# Patient Record
Sex: FEMALE | Race: WHITE | Employment: OTHER | URBAN - METROPOLITAN AREA
[De-identification: names, ages, dates, MRNs, and addresses within clinical notes are randomized per-mention and may not be internally consistent; named-entity substitution may affect disease eponyms.]

---

## 2017-05-03 ENCOUNTER — HOSPITAL ENCOUNTER (OUTPATIENT)
Dept: GENERAL RADIOLOGY | Age: 67
Discharge: HOME | End: 2017-05-03
Attending: OBSTETRICS & GYNECOLOGY | Admitting: OBSTETRICS & GYNECOLOGY

## 2017-05-03 DIAGNOSIS — Z13.820 ENCOUNTER FOR SCREENING FOR OSTEOPOROSIS: ICD-10-CM

## 2017-05-03 DIAGNOSIS — Z13.820 SCREENING FOR OSTEOPOROSIS: ICD-10-CM

## 2018-10-26 DIAGNOSIS — Z12.11 SCREENING FOR COLON CANCER: ICD-10-CM

## 2018-10-26 DIAGNOSIS — Z12.11 SCREENING FOR COLON CANCER: Primary | ICD-10-CM

## 2018-10-26 NOTE — TELEPHONE ENCOUNTER
Colonoscopy scheduled. Golytely and Dulcolax needs ordered. Please review allergy list before you sign.

## 2018-10-29 ENCOUNTER — TELEPHONE (OUTPATIENT)
Dept: GASTROENTEROLOGY | Age: 68
End: 2018-10-29

## 2018-11-20 ENCOUNTER — HOSPITAL ENCOUNTER (OUTPATIENT)
Dept: GENERAL RADIOLOGY | Age: 68
Discharge: HOME | End: 2018-11-20
Payer: MEDICARE

## 2018-11-20 ENCOUNTER — HOSPITAL ENCOUNTER (OUTPATIENT)
Age: 68
Discharge: HOME | End: 2018-11-20
Payer: MEDICARE

## 2018-11-20 DIAGNOSIS — M54.41 ACUTE BILATERAL LOW BACK PAIN WITH RIGHT-SIDED SCIATICA: ICD-10-CM

## 2018-11-20 PROCEDURE — 72100 X-RAY EXAM L-S SPINE 2/3 VWS: CPT

## 2018-12-03 ENCOUNTER — TELEPHONE (OUTPATIENT)
Dept: GASTROENTEROLOGY | Age: 68
End: 2018-12-03

## 2021-01-06 ENCOUNTER — HOSPITAL ENCOUNTER (OUTPATIENT)
Dept: PHYSICAL THERAPY | Age: 71
Setting detail: THERAPIES SERIES
Discharge: HOME | End: 2021-01-06
Payer: MEDICARE

## 2021-01-06 NOTE — FLOWSHEET NOTE
[facility] Outpatient Rehabilitation and Therapy  [address] [place] [address], [address] [facility] [address]  Phone: [phone]   Fax: [phone]      Physical Therapy  Cancellation/No-show Note  Patient Name:  Houston Fernández  :  1950   Date:  2021  Cancels to date: 1  No-shows to date: 0    For today's appointment patient:  [x] Cancelled  [] Rescheduled appointment  [] No-show     Reason given by patient:  [] Patient ill  [] Conflicting appointment  [] No transportation    [] Conflict with work  [] No reason given  [x] Other:     Comments:  \"hurt back\"    Electronically signed by:  Luis Armando Calvert, PT, DPT

## 2021-01-06 NOTE — PROGRESS NOTES
Physical Therapy  Patient called and left a voicemail stating that she hurt her back and could not  Keep todays appointment

## 2021-08-09 ENCOUNTER — HOSPITAL ENCOUNTER (OUTPATIENT)
Dept: PHYSICAL THERAPY | Age: 71
Setting detail: THERAPIES SERIES
Discharge: HOME | End: 2021-08-09
Payer: MEDICARE

## 2021-08-09 PROCEDURE — 97140 MANUAL THERAPY 1/> REGIONS: CPT

## 2021-08-09 PROCEDURE — 97530 THERAPEUTIC ACTIVITIES: CPT

## 2021-08-09 PROCEDURE — 97161 PT EVAL LOW COMPLEX 20 MIN: CPT
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
## 2021-08-09 NOTE — FLOWSHEET NOTE
[facility] Outpatient Rehabilitation and Therapy  [address] [place] [address], [address] [place] [address] [address]  Phone: [phone]   Fax: [phone]        Physical Therapy Daily Treatment Note    Date:  2021    Patient Name:  Cristy Dolan    :  1950  MRN: [id number]  Restrictions/Precautions: universal   Medical/Treatment Diagnosis Information:  · Diagnosis: R32 Unspecified Urinary Incontinence  Insurance/Certification information:  PT Insurance Information: A[employer]na Medicare  Physician Information:  Referring Practitioner: Dr. Gonzalez Freedman DO  Plan of care signed (Y/N): N, faxed  Visit# / total visits:       Functional Outcome (if applicable):          PFDI: 113/300    Time in:   2:00pm      Timed Treatment: 70min. Total Treatment Time: 85min.  ________________________________________________________________________________________    Pain Level:    /10  SUBJECTIVE:  see eval    OBJECTIVE:     Exercise (TE) Resistance/Repetitions Other comments            PF strengthening                                  PF relaxation                                   Other Treatment:   TA:  Bladder re-training/education:     Bladder Diary: patient educated on importance of filling out bladder diary at home, complete with fluid intake, voids, and leakage when applicable. Voiding: patient educated on normal voiding and urinary cycle and the physiology of bladder control muscles and pelvic floor. The patient was educated on bladder dysfunction as well as YAS/UUI with urethral involvement. The patient was also educated on prolapse and it's affect on urination. Dietary: patient educated on the \"4Cs\" to reduce bladder irritation and leakage.     Other: general pelvic floor education    Manual Treatments:  PERFECT, myofacial release       Test/Measurements:  See eval           ASSESSMENT:    See eval     Treatment/Activity Tolerance:   [x] Patient tolerated treatment well [] Patient limited by radhames  [] Patient limited by pain [] Patient limited by other medical complications  [] Other:     Goals:       Patient stated goal: \"hold my urine\"  []? Progressing: []? Met: []? Not Met: []? Adjusted        Therapist goals for Patient:   Short Term Goals: To be achieved in: 2 weeks  1. Independent in HEP and progression per patient tolerance, in order to prevent future occurrence of presenting issue. []? Progressing: []? Met: []? Not Met: []? Adjusted  2. Patient will have a decrease in urinary incontinence to indicate improvement in pelvic floor strength and relaxation, muscle coordination, and/or bladder retraining.  []? Progressing: []? Met: []? Not Met: []? Adjusted     Long Term Goals: To be achieved in: 8 weeks  1. Disability index score of 50 or less for the PFDI-20 to assist with reaching prior level of function. []? Progressing: []? Met: []? Not Met: []? Adjusted  2. Patient will increase PERF score to 4/5 and endurance to 10sec. to demonstrate increased strength and control over pelvic floor musculature. []? Progressing: []? Met: []? Not Met: []? Adjusted  3. Patient will report 1 day or greater without need for urinary pad to progress towards completing ADLs and recreational activities without leakage. []? Progressing: []? Met: []? Not Met: []? Adjusted   4. Patient will return to functional activities including walking, coughing, lifting without increased symptoms or restriction. []? Progressing: []? Met: []? Not Met: []?  Adjusted          Plan: [x] Continue per plan of care [] Alter current plan (see comments)   [] Plan of care initiated [] Hold pending MD visit [] Discharge      Plan for Next Session: review diary, begin TE    Re-Certification Due Date: 9/9/2021        Signature:  Kalina Ramirez, PT, DPT

## 2021-08-16 ENCOUNTER — HOSPITAL ENCOUNTER (OUTPATIENT)
Dept: PHYSICAL THERAPY | Age: 71
Setting detail: THERAPIES SERIES
Discharge: HOME | End: 2021-08-16
Payer: MEDICARE

## 2021-08-16 PROCEDURE — 97110 THERAPEUTIC EXERCISES: CPT

## 2021-08-16 PROCEDURE — 97530 THERAPEUTIC ACTIVITIES: CPT

## 2021-08-16 NOTE — FLOWSHEET NOTE
[facility] Outpatient Rehabilitation and Therapy  [address] [place] [address], [address] [facility] [address]  Phone: [phone]   Fax: [phone]        Physical Therapy Daily Treatment Note    Date:  2021    Patient Name:  Hyun Calvillo    :  1950  MRN: [id number]  Restrictions/Precautions: universal   Medical/Treatment Diagnosis Information:  · Diagnosis: R32 Unspecified Urinary Incontinence  Insurance/Certification information:  PT Insurance Information: A[employer]na Medicare  Physician Information:  Referring Practitioner: Dr. Alexi Garrett. Brandyn Rowe DO  Plan of care signed (Y/N): N, faxed  Visit# / total visits:       Functional Outcome (if applicable):          PFDI: 113/300    Time in:   3:30pm      Timed Treatment: 45min. Total Treatment Time: 45min.  ________________________________________________________________________________________    Pain Level:    0/10  SUBJECTIVE:  Patient reports \"I am doing good\". No new complaints. OBJECTIVE:     Exercise (TE) Resistance/Repetitions Other comments            PF strengthening        Short hold: (1sec) 10 times       Long hold: (5sec) 10 times                     PF relaxation                                   HEP:  Access Code: [email or web address]  URL: [email or web address]  Date: 2021  Prepared by: Yuni Cole    Exercises  Seated Pelvic Floor Contraction - 1 x daily - 7 x weekly - 1 sets - 10 reps - 1sec. hold  Seated Pelvic Floor Contraction - 1 x daily - 7 x weekly - 1 sets - 10 reps - 5sec. hold      Other Treatment:   TA:  Bladder re-training/education:     Bladder Diary: 7-10day voids, 6-8 leaks/day, 5 BMs/day \"knots\",     Voiding: voiding every 2-3 hours    Dietary: patient educated on the \"4Cs\" to reduce bladder irritation and leakage, fiber education.     Other: water goal: 60oz/day, fiber goal: 20g/day    Manual Treatments:       Test/Measurements:  See eval           ASSESSMENT:   The patient is reporting frequent urination, leaks and hard BMs. The patient is consuming some bladder irritants and limited fiber. The patient is also on a lot of medication that could be contributing to constipation as well. Patient responded well to education provided and should improve with compliance. Introduced TE to patient this date, patient responded well. Will follow up in 1 week. Treatment/Activity Tolerance:   [x] Patient tolerated treatment well [] Patient limited by fatique  [] Patient limited by pain [] Patient limited by other medical complications  [] Other:     Goals:       Patient stated goal: \"hold my urine\"  []? Progressing: []? Met: []? Not Met: []? Adjusted        Therapist goals for Patient:   Short Term Goals: To be achieved in: 2 weeks  1. Independent in HEP and progression per patient tolerance, in order to prevent future occurrence of presenting issue. []? Progressing: []? Met: []? Not Met: []? Adjusted  2. Patient will have a decrease in urinary incontinence to indicate improvement in pelvic floor strength and relaxation, muscle coordination, and/or bladder retraining.  []? Progressing: []? Met: []? Not Met: []? Adjusted     Long Term Goals: To be achieved in: 8 weeks  1. Disability index score of 50 or less for the PFDI-20 to assist with reaching prior level of function. []? Progressing: []? Met: []? Not Met: []? Adjusted  2. Patient will increase PERF score to 4/5 and endurance to 10sec. to demonstrate increased strength and control over pelvic floor musculature. []? Progressing: []? Met: []? Not Met: []? Adjusted  3. Patient will report 1 day or greater without need for urinary pad to progress towards completing ADLs and recreational activities without leakage. []? Progressing: []? Met: []? Not Met: []? Adjusted   4. Patient will return to functional activities including walking, coughing, lifting without increased symptoms or restriction. []? Progressing: []? Met: []? Not Met: []?  Adjusted          Plan: [x] Continue per plan of care [] Alter current plan (see comments)   [] Plan of care initiated [] Hold pending MD visit [] Discharge      Plan for Next Session: cont.  TE    Re-Certification Due Date: 9/9/2021        Signature:  Karen Durant, PT, DPT

## 2021-08-23 ENCOUNTER — HOSPITAL ENCOUNTER (OUTPATIENT)
Dept: PHYSICAL THERAPY | Age: 71
Setting detail: THERAPIES SERIES
Discharge: HOME | End: 2021-08-23
Payer: MEDICARE

## 2021-08-23 PROCEDURE — 97110 THERAPEUTIC EXERCISES: CPT

## 2021-08-23 NOTE — FLOWSHEET NOTE
[facility] Outpatient Rehabilitation and Therapy  [address] [place] [address] 88, [address] [facility] [address]  Phone: [phone]   Fax: [phone]        Physical Therapy Daily Treatment Note    Date:  2021    Patient Name:  Lorenzo Bonner    :  1950  MRN: [id number]  Restrictions/Precautions: universal   Medical/Treatment Diagnosis Information:  · Diagnosis: R32 Unspecified Urinary Incontinence  Insurance/Certification information:  PT Insurance Information: Aetna Medicare  Physician Information:  Referring Practitioner: Dr. Krzysztof Kumari. Khushi Dona DO  Plan of care signed (Y/N): N, faxed  Visit# / total visits:  3/8     Functional Outcome (if applicable):          PFDI: 113/300    Time in:   2:00pm      Timed Treatment: 45min. Total Treatment Time: 45min.  ________________________________________________________________________________________    Pain Level:    0/10  SUBJECTIVE:  Patient reports \"I am doing okay, so many things I am allergic to have fiber in them\". Reports \"nuts help more than anything, so I have been eating those\". Reports \"overall it seems to have been better with leaking\". No new complaints. OBJECTIVE:     Exercise (TE) Resistance/Repetitions Other comments            PF strengthening        Short hold: (1sec) 10 times       Long hold: (5sec) 10 times     PF + Hip ABD x10 W/ blue tband   PF + Hip ADD x10 W/ ball squeeze         Hook-lying TA  Until achieved with BP cuff  TA only [id number]. TA + PF [id number]. PF relaxation                                   HEP:  Access Code: [email or web address]  URL: [email or web address]  Date: 2021  Prepared by: Laly Sandhu    Exercises  Seated Pelvic Floor Contraction - 1 x daily - 7 x weekly - 1 sets - 10 reps - 1sec. hold  Seated Pelvic Floor Contraction - 1 x daily - 7 x weekly - 1 sets - 10 reps - 5sec. hold  Seated Pelvic Floor Contraction with Hip Abduction and Resistance Loop - 1 x daily - 7 x weekly - 1 sets - 10 reps - 2-3sec. hold  Seated Pelvic Floor Contraction with Isometric Hip Adduction - 1 x daily - 7 x weekly - 1 sets - 10 reps - 2-3sec. hold  Supine Transversus Abdominis Bracing with Pelvic Floor Contraction - 1 x daily - 7 x weekly - 1 sets - 10 reps - 10sec. hold  Seated Transversus Abdominis Bracing - 1 x daily - 7 x weekly - 1 sets - 10 reps        Other Treatment:       Manual Treatments:       Test/Measurements:  See eval           ASSESSMENT: The patient is progressing nicely overall, with reduced complaints of leakage. The patient adapted nicely to added TE this date. The patient will continue to progress and follow up next week for progression. Treatment/Activity Tolerance:   [x] Patient tolerated treatment well [] Patient limited by fatique  [] Patient limited by pain [] Patient limited by other medical complications  [] Other:     Goals:       Patient stated goal: \"hold my urine\"  []? Progressing: []? Met: []? Not Met: []? Adjusted        Therapist goals for Patient:   Short Term Goals: To be achieved in: 2 weeks  1. Independent in HEP and progression per patient tolerance, in order to prevent future occurrence of presenting issue. []? Progressing: []? Met: []? Not Met: []? Adjusted  2. Patient will have a decrease in urinary incontinence to indicate improvement in pelvic floor strength and relaxation, muscle coordination, and/or bladder retraining.  []? Progressing: []? Met: []? Not Met: []? Adjusted     Long Term Goals: To be achieved in: 8 weeks  1. Disability index score of 50 or less for the PFDI-20 to assist with reaching prior level of function. []? Progressing: []? Met: []? Not Met: []? Adjusted  2. Patient will increase PERF score to 4/5 and endurance to 10sec. to demonstrate increased strength and control over pelvic floor musculature. []? Progressing: []? Met: []? Not Met: []? Adjusted  3.   Patient will report 1 day or greater without need for urinary pad to progress towards completing ADLs and recreational activities without leakage. []? Progressing: []? Met: []? Not Met: []? Adjusted   4. Patient will return to functional activities including walking, coughing, lifting without increased symptoms or restriction. []? Progressing: []? Met: []? Not Met: []? Adjusted          Plan: [x] Continue per plan of care [] Alter current plan (see comments)   [] Plan of care initiated [] Hold pending MD visit [] Discharge      Plan for Next Session: cont.  TE    Re-Certification Due Date: 9/9/2021        Signature:  Deric Galvan, PT, DPT

## 2021-08-30 ENCOUNTER — HOSPITAL ENCOUNTER (OUTPATIENT)
Dept: PHYSICAL THERAPY | Age: 71
Setting detail: THERAPIES SERIES
Discharge: HOME | End: 2021-08-30
Payer: MEDICARE

## 2021-08-30 NOTE — PROGRESS NOTES
Physical Therapy  Patient left voicemail stating that she needed to cancel todays appointment 8/31/21 no reason given, confirmed next appointment on 9/6/21

## 2021-08-30 NOTE — FLOWSHEET NOTE
[facility] Outpatient Rehabilitation and Therapy  [address] [place] [address] 30, [address] [facility] [address]  Phone: [phone]   Fax: [phone]      Physical Therapy  Cancellation/No-show Note  Patient Name:  Jennifer Mabry  :  1950   Date:  2021  Cancels to date: 1  No-shows to date: 0    For today's appointment patient:  [x] Cancelled  [] Rescheduled appointment  [] No-show     Reason given by patient:  [] Patient ill  [] Conflicting appointment  [] No transportation    [] Conflict with work  [x] No reason given  [] Other:     Comments:      Electronically signed by:  Randa Hodgkins, PT

## 2021-09-06 ENCOUNTER — APPOINTMENT (OUTPATIENT)
Dept: PHYSICAL THERAPY | Age: 71
End: 2021-09-06
Payer: MEDICARE

## 2021-09-13 ENCOUNTER — HOSPITAL ENCOUNTER (OUTPATIENT)
Dept: PHYSICAL THERAPY | Age: 71
Setting detail: THERAPIES SERIES
Discharge: HOME | End: 2021-09-13
Payer: MEDICARE

## 2021-09-13 PROCEDURE — 97110 THERAPEUTIC EXERCISES: CPT

## 2021-09-13 PROCEDURE — 97530 THERAPEUTIC ACTIVITIES: CPT

## 2021-09-13 NOTE — FLOWSHEET NOTE
[facility] Outpatient Rehabilitation and Therapy  [address] [place] [address] 79, [address] [facility] [address]  Phone: [phone]   Fax: [phone]        Physical Therapy Daily Treatment Note    Date:  2021    Patient Name:  Belen Day    :  1950  MRN: [id number]  Restrictions/Precautions: universal   Medical/Treatment Diagnosis Information:  · Diagnosis: R32 Unspecified Urinary Incontinence  Insurance/Certification information:  PT Insurance Information: Aetna Medicare  Physician Information:  Referring Practitioner: Dr. Rochelle Palmer. Andrzej Bowie DO  Plan of care signed (Y/N): N, faxed  Visit# / total visits:       Functional Outcome (if applicable):          PFDI: 113/300    Time in:   1:20pm      Timed Treatment: 45min. Total Treatment Time: 45min.  ________________________________________________________________________________________    Pain Level:    0/10  SUBJECTIVE:  Patient reports her incontinence is worse since she strained her back last week. Reports before she strained her back her incontinence was getting better, except \"I think dark cherries aren't good for my bladder\". No new complaints. OBJECTIVE:     Exercise (TE) Resistance/Repetitions Other comments          PF strengthening        Short hold: (1sec) 10 times       Long hold: (5sec) 10 times     PF + Hip ABD x10 W/ blue tband   PF + Hip ADD x10 W/ ball squeeze         Hook-lying TA  Until achieved with BP cuff  TA only [id number]. TA + PF [id number]. PF relaxation                                   HEP:  Access Code: [email or web address]  URL: [email or web address]  Date: 2021  Prepared by: Mario Stuart    Exercises  Seated Pelvic Floor Contraction - 1 x daily - 7 x weekly - 1 sets - 10 reps - 1sec. hold  Seated Pelvic Floor Contraction - 1 x daily - 7 x weekly - 1 sets - 10 reps - 5sec. hold  Seated Pelvic Floor Contraction with Hip Abduction and Resistance Loop - 1 x daily - 7 x weekly - 1 sets - 10 reps - 2-3sec. hold  Seated Pelvic Floor Contraction with Isometric Hip Adduction - 1 x daily - 7 x weekly - 1 sets - 10 reps - 2-3sec. hold  Supine Transversus Abdominis Bracing with Pelvic Floor Contraction - 1 x daily - 7 x weekly - 1 sets - 10 reps - 10sec. hold  Seated Transversus Abdominis Bracing - 1 x daily - 7 x weekly - 1 sets - 10 reps        Other Treatment:       Manual Treatments:       Test/Measurements:  See eval           ASSESSMENT: The patient is progressing nicely overall, with reduced complaints of leakage. The patient adapted nicely to added TE this date. The patient will continue to progress and follow up in 2 weeks for progression. Treatment/Activity Tolerance:   [x] Patient tolerated treatment well [] Patient limited by fatique  [] Patient limited by pain [] Patient limited by other medical complications  [] Other:     Goals:       Patient stated goal: \"hold my urine\"  []? Progressing: []? Met: []? Not Met: []? Adjusted        Therapist goals for Patient:   Short Term Goals: To be achieved in: 2 weeks  1. Independent in HEP and progression per patient tolerance, in order to prevent future occurrence of presenting issue. []? Progressing: []? Met: []? Not Met: []? Adjusted  2. Patient will have a decrease in urinary incontinence to indicate improvement in pelvic floor strength and relaxation, muscle coordination, and/or bladder retraining.  []? Progressing: []? Met: []? Not Met: []? Adjusted     Long Term Goals: To be achieved in: 8 weeks  1. Disability index score of 50 or less for the PFDI-20 to assist with reaching prior level of function. []? Progressing: []? Met: []? Not Met: []? Adjusted  2. Patient will increase PERF score to 4/5 and endurance to 10sec. to demonstrate increased strength and control over pelvic floor musculature. []? Progressing: []? Met: []? Not Met: []? Adjusted  3.   Patient will report 1 day or greater without need for urinary pad to progress towards completing ADLs and recreational activities without leakage. []? Progressing: []? Met: []? Not Met: []? Adjusted   4. Patient will return to functional activities including walking, coughing, lifting without increased symptoms or restriction. []? Progressing: []? Met: []? Not Met: []? Adjusted          Plan: [x] Continue per plan of care [] Alter current plan (see comments)   [] Plan of care initiated [] Hold pending MD visit [] Discharge      Plan for Next Session: cont.  TE    Re-Certification Due Date: 9/9/2021        Signature:  Shara Schaumann, PT, DPT

## 2021-09-20 ENCOUNTER — APPOINTMENT (OUTPATIENT)
Dept: PHYSICAL THERAPY | Age: 71
End: 2021-09-20
Payer: MEDICARE

## 2021-09-27 ENCOUNTER — HOSPITAL ENCOUNTER (OUTPATIENT)
Dept: PHYSICAL THERAPY | Age: 71
Setting detail: THERAPIES SERIES
Discharge: HOME | End: 2021-09-27
Payer: MEDICARE

## 2021-09-27 PROCEDURE — 97140 MANUAL THERAPY 1/> REGIONS: CPT

## 2021-09-27 PROCEDURE — 97110 THERAPEUTIC EXERCISES: CPT

## 2021-09-27 PROCEDURE — 97530 THERAPEUTIC ACTIVITIES: CPT

## 2021-09-27 NOTE — FLOWSHEET NOTE
[facility] Outpatient Rehabilitation and Therapy  [address] [place] [address] 14, [address] [facility] [address]  Phone: [phone]   Fax: [phone]        Physical Therapy Daily Treatment Note and Discharge Summary     Date:  2021    Patient Name:  Tomasa Woo    :  1950  MRN: [id number]  Restrictions/Precautions: universal   Medical/Treatment Diagnosis Information:  · Diagnosis: R32 Unspecified Urinary Incontinence  Insurance/Certification information:  PT Insurance Information: Aetna Medicare  Physician Information:  Referring Practitioner: Dr. Sharla Anthony. Rita Hernandez DO  Plan of care signed (Y/N): N, faxed  Visit# / total visits:  5 (2021 to 2021)     Functional Outcome (if applicable):          PFDI: 128/300 (was 113/300)    Time in:   4:10pm      Timed Treatment: 45min. Total Treatment Time: 45min.  ________________________________________________________________________________________    Pain Level:    0/10  SUBJECTIVE:  Patient reports \"my back is better since I have moving around and doing some stretching\". \"I think the exercises are making the pain worse, so I am going to stop therapy for my pelvic floor until I get my back figured out\". Patient reports her incontinence is not better. No new complaints. OBJECTIVE:     Exercise (TE) Resistance/Repetitions Other comments          PF strengthening        Short hold: (1sec) 10 times       Long hold: (5sec) 10 times     PF + Hip ABD x10 W/ blue tband   PF + Hip ADD x10 W/ ball squeeze         Hook-lying TA  Until achieved with BP cuff  TA only [id number]. TA + PF [id number]. HEP:  Access Code: [email or web address]  URL: [email or web address]  Date: 2021  Prepared by: Lorna Callaway    Exercises  Seated Pelvic Floor Contraction - 1 x daily - 7 x weekly - 1 sets - 10 reps - 1sec. hold  Seated Pelvic Floor Contraction - 1 x daily - 7 x weekly - 1 sets - 10 reps - 5sec. hold  Seated Pelvic Floor Contraction with Hip Abduction and Resistance Loop - 1 x daily - 7 x weekly - 1 sets - 10 reps - 2-3sec. hold  Seated Pelvic Floor Contraction with Isometric Hip Adduction - 1 x daily - 7 x weekly - 1 sets - 10 reps - 2-3sec. hold  Supine Transversus Abdominis Bracing with Pelvic Floor Contraction - 1 x daily - 7 x weekly - 1 sets - 10 reps - 10sec. hold  Seated Transversus Abdominis Bracing - 1 x daily - 7 x weekly - 1 sets - 10 reps        Other Treatment:       Manual Treatments:  PERFECT    Test/Measurements:    External:  Skin Condition: normal  Sensation: intact  Palpation: no point tenderness noted  Tone: normal  Perineal Body Mobility:               Voluntary PFM Contraction: present (normal)              Voluntary PFM Relaxation: present (normal)              Involuntary PFM Contraction/Cough Reflex: present (normal)              Involuntary PFM Relaxation: present (normal)  Perineal Body Descent:              Rest: supported              Bearing down: absent (normal-cephalad)     Internal:  Sensation: intact  Palpation: no point tenderness noted  Vaginal Vault Size: WNL  PERFECT:              Power: 3+/5              Endurance: 6sec.(was 5sec.)              Repetitions:5              Fast Twitch:6              Elevation:present              Co-contraction: present (was weak)              Timing: present (was weak)     Pelvic Organ Prolapse: none present on today's exam (was Grade 1 cystocele)           ASSESSMENT: After 5 PFPT visits, the patient has made improvement in coordination of pelvic floor, the patient is reporting increased symptoms however since hurting her back earlier in the month. The patient is choosing to end her therapy early to allow her to focus on back and then will follow up with PF if needed. Encouraged patient to continue with HEP.            Treatment/Activity Tolerance:   [x] Patient tolerated treatment well [] Patient limited by fatique  [] Patient limited by pain [] Patient limited by other medical complications  [] Other:     Goals:    Patient stated goal: \"hold my urine\"  []? Progressing: [x]? Met: []? Not Met: []? Adjusted        Therapist goals for Patient:   Short Term Goals: To be achieved in: 2 weeks  1. Independent in HEP and progression per patient tolerance, in order to prevent future occurrence of presenting issue. []? Progressing: [x]? Met: []? Not Met: []? Adjusted  2. Patient will have a decrease in urinary incontinence to indicate improvement in pelvic floor strength and relaxation, muscle coordination, and/or bladder retraining.  []? Progressing: []? Met: [x]? Not Met: []? Adjusted     Long Term Goals: To be achieved in: 8 weeks  1. Disability index score of 50 or less for the PFDI-20 to assist with reaching prior level of function. []? Progressing: []? Met: [x]? Not Met: []? Adjusted  2. Patient will increase PERF score to 4/5 and endurance to 10sec. to demonstrate increased strength and control over pelvic floor musculature. []? Progressing: []? Met: [x]? Not Met: []? Adjusted  3. Patient will report 1 day or greater without need for urinary pad to progress towards completing ADLs and recreational activities without leakage. []? Progressing: []? Met: [x]? Not Met: []? Adjusted   4. Patient will return to functional activities including walking, coughing, lifting without increased symptoms or restriction. []? Progressing: []? Met: [x]? Not Met: []?  Adjusted          Plan: [] Continue per plan of care [] Alter current plan (see comments)   [] Plan of care initiated [] Hold pending MD visit [x] Discharge       Signature:  Shara Schaumann, PT, DPT

## 2023-09-20 ENCOUNTER — HOSPITAL ENCOUNTER (OUTPATIENT)
Age: 73
Discharge: HOME | End: 2023-09-20
Payer: MEDICARE

## 2023-09-20 ENCOUNTER — HOSPITAL ENCOUNTER (OUTPATIENT)
Dept: GENERAL RADIOLOGY | Age: 73
Discharge: HOME | End: 2023-09-20
Payer: MEDICARE

## 2023-09-20 DIAGNOSIS — M99.04 SACRAL REGION SOMATIC DYSFUNCTION: ICD-10-CM

## 2023-09-20 DIAGNOSIS — M99.03 SOMATIC DYSFUNCTION OF LUMBAR REGION: ICD-10-CM

## 2023-09-20 DIAGNOSIS — M99.02 THORACIC SEGMENT DYSFUNCTION: ICD-10-CM

## 2023-09-20 PROCEDURE — 72070 X-RAY EXAM THORAC SPINE 2VWS: CPT

## 2023-09-20 PROCEDURE — 72100 X-RAY EXAM L-S SPINE 2/3 VWS: CPT

## 2024-06-12 ENCOUNTER — TELEPHONE (OUTPATIENT)
Dept: SURGERY | Age: 74
End: 2024-06-12

## 2024-06-14 NOTE — TELEPHONE ENCOUNTER
Received OV notes from [facility] and scanned into chart.     Patient stated she had seen a Dr. Mora and had a lot of testing done and got upset with him and threw his number away. He has probably retired by now anyway .

## 2024-06-17 ENCOUNTER — OFFICE VISIT (OUTPATIENT)
Dept: SURGERY | Age: 74
End: 2024-06-17
Payer: MEDICARE

## 2024-06-17 VITALS
SYSTOLIC BLOOD PRESSURE: 149 MMHG | HEART RATE: 76 BPM | WEIGHT: 179 LBS | DIASTOLIC BLOOD PRESSURE: 72 MMHG | BODY MASS INDEX: 27.22 KG/M2

## 2024-06-17 DIAGNOSIS — R10.13 EPIGASTRIC PAIN: Primary | ICD-10-CM

## 2024-06-17 PROCEDURE — 99204 OFFICE O/P NEW MOD 45 MIN: CPT | Performed by: SURGERY

## 2024-06-17 PROCEDURE — 1123F ACP DISCUSS/DSCN MKR DOCD: CPT | Performed by: SURGERY

## 2024-06-17 NOTE — PROGRESS NOTES
PATIENT NAME: Milli Moya     YOB: 1950     TODAY'S DATE: 6/25/2024    Reason for Visit:  Hiatal hernia    Requesting Physician:  Dr. Carroll    HISTORY OF PRESENT ILLNESS:              The patient is a 73 y.o. female with a PMHx as delineated below who presents with typical GERD like symptoms plus epigastric pain. The epigastric pain typically follows meals. She has not undergone any work up for her GERD's and does not take and prescribed antacids.     Chief Complaint   Patient presents with    New Patient      hiatial hernia         REVIEW OF SYSTEMS:  CONSTITUTIONAL:  negative  HEENT:  negative  RESPIRATORY:  negative  CARDIOVASCULAR:  negative  GASTROINTESTINAL:  negative except for abdominal pain and reflux  GENITOURINARY:  negative  HEMATOLOGIC/LYMPHATIC:  negative  MUSCULOSKELETAL: negative  NEUROLOGICAL:  negative    PMH  Past Medical History:   Diagnosis Date    Anxiety     Arthritis     Depression     Fibromyalgia        PSH  Past Surgical History:   Procedure Laterality Date    BACK SURGERY  2015    HYSTERECTOMY (CERVIX STATUS UNKNOWN)         Social History  Social History     Socioeconomic History    Marital status: Single     Spouse name: Not on file    Number of children: Not on file    Years of education: Not on file    Highest education level: Not on file   Occupational History    Not on file   Tobacco Use    Smoking status: Never    Smokeless tobacco: Never   Substance and Sexual Activity    Alcohol use: No    Drug use: No    Sexual activity: Not on file   Other Topics Concern    Not on file   Social History Narrative    Not on file     Social Determinants of Health     Financial Resource Strain: Not on file   Food Insecurity: Not on file   Transportation Needs: Not on file   Physical Activity: Not on file   Stress: Not on file   Social Connections: Not on file   Intimate Partner Violence: Not on file   Housing Stability: Not on file       Family History:   No family history on

## 2024-06-20 ENCOUNTER — TELEPHONE (OUTPATIENT)
Dept: SURGERY | Age: 74
End: 2024-06-20

## 2024-06-20 DIAGNOSIS — K21.00 GASTROESOPHAGEAL REFLUX DISEASE WITH ESOPHAGITIS, UNSPECIFIED WHETHER HEMORRHAGE: Primary | ICD-10-CM

## 2024-06-20 NOTE — TELEPHONE ENCOUNTER
LM informing patient that order was placed and to call central scheduling at [phone] to schedule the the test

## 2024-07-03 ENCOUNTER — HOSPITAL ENCOUNTER (OUTPATIENT)
Dept: GENERAL RADIOLOGY | Age: 74
Discharge: HOME | End: 2024-07-03
Attending: SURGERY
Payer: MEDICARE

## 2024-07-03 DIAGNOSIS — K21.00 GASTROESOPHAGEAL REFLUX DISEASE WITH ESOPHAGITIS, UNSPECIFIED WHETHER HEMORRHAGE: ICD-10-CM

## 2024-07-03 PROCEDURE — 2500000003 HC RX 250 WO HCPCS: Performed by: SURGERY

## 2024-07-03 PROCEDURE — 74220 X-RAY XM ESOPHAGUS 1CNTRST: CPT

## 2024-07-03 RX ADMIN — BARIUM SULFATE 150 ML: 960 POWDER, FOR SUSPENSION ORAL at 13:00

## 2024-07-03 RX ADMIN — BARIUM SULFATE 140 ML: 980 POWDER, FOR SUSPENSION ORAL at 13:01

## 2024-07-03 RX ADMIN — BARIUM SULFATE 1 TABLET: 700 TABLET ORAL at 13:01

## 2024-07-09 ENCOUNTER — TELEPHONE (OUTPATIENT)
Dept: SURGERY | Age: 74
End: 2024-07-09

## 2024-07-09 NOTE — TELEPHONE ENCOUNTER
Patient called in wanting to go over her Esophagram results     Please contact the patient at [phone]

## 2024-07-19 ENCOUNTER — OFFICE VISIT (OUTPATIENT)
Dept: SURGERY | Age: 74
End: 2024-07-19

## 2024-07-19 DIAGNOSIS — R10.13 EPIGASTRIC PAIN: Primary | ICD-10-CM

## 2024-07-23 ENCOUNTER — TELEPHONE (OUTPATIENT)
Dept: SURGERY | Age: 74
End: 2024-07-23

## 2024-07-23 DIAGNOSIS — R10.11 POSTPRANDIAL RUQ PAIN: Primary | ICD-10-CM

## 2024-07-23 NOTE — TELEPHONE ENCOUNTER
LM informing patient that the US order was placed and to call central scheduling to schedule the test

## 2024-07-25 ENCOUNTER — HOSPITAL ENCOUNTER (OUTPATIENT)
Dept: ULTRASOUND IMAGING | Age: 74
Discharge: HOME | End: 2024-07-25
Attending: SURGERY
Payer: MEDICARE

## 2024-07-25 DIAGNOSIS — R10.11 POSTPRANDIAL RUQ PAIN: ICD-10-CM

## 2024-07-25 PROCEDURE — 76705 ECHO EXAM OF ABDOMEN: CPT

## 2024-07-26 ENCOUNTER — TELEPHONE (OUTPATIENT)
Dept: SURGERY | Age: 74
End: 2024-07-26

## 2024-07-26 DIAGNOSIS — R10.11 POSTPRANDIAL RUQ PAIN: Primary | ICD-10-CM

## 2024-07-26 NOTE — TELEPHONE ENCOUNTER
Patient states she completed the Ultrasound on 07/25/2024 as instructed and would like to know what the next steps are moving forward.     The patient can be reached at [phone]. Patient states if there is no answer to please leave a detailed message.

## 2024-07-29 NOTE — TELEPHONE ENCOUNTER
LM informing patient that MD would like for her to have a HIDA scan. The order has been placed and to call central scheduling at [phone] to get the scan scheduled

## 2024-08-05 ENCOUNTER — HOSPITAL ENCOUNTER (OUTPATIENT)
Dept: NUCLEAR MEDICINE | Age: 74
Discharge: HOME | End: 2024-08-05
Payer: MEDICARE

## 2024-08-05 VITALS — WEIGHT: 175 LBS

## 2024-08-05 DIAGNOSIS — R10.11 POSTPRANDIAL RUQ PAIN: ICD-10-CM

## 2024-08-05 PROCEDURE — 6360000004 HC RX CONTRAST MEDICATION: Performed by: SURGERY

## 2024-08-05 PROCEDURE — 2580000003 HC RX 258: Performed by: SURGERY

## 2024-08-05 PROCEDURE — 3430000000 HC RX DIAGNOSTIC RADIOPHARMACEUTICAL: Performed by: SURGERY

## 2024-08-05 PROCEDURE — A9537 TC99M MEBROFENIN: HCPCS | Performed by: SURGERY

## 2024-08-05 PROCEDURE — 78227 HEPATOBIL SYST IMAGE W/DRUG: CPT

## 2024-08-05 RX ADMIN — Medication 6.1 MILLICURIE: at 09:42

## 2024-08-05 RX ADMIN — SINCALIDE 1.59 MCG: 5 INJECTION, POWDER, LYOPHILIZED, FOR SOLUTION INTRAVENOUS at 11:12

## 2024-08-08 ENCOUNTER — TELEPHONE (OUTPATIENT)
Dept: SURGERY | Age: 74
End: 2024-08-08

## 2024-08-08 NOTE — PROGRESS NOTES
PATIENT NAME: Milli Moya     YOB: 1950     TODAY'S DATE: 8/8/2024    Reason for Visit:  Hiatal hernia    Requesting Physician:  Dr. Carroll    HISTORY OF PRESENT ILLNESS:              The patient is a 74 y.o. female with a PMHx as delineated below who presents with typical GERD like symptoms plus epigastric pain. The epigastric pain typically follows meals. She has not undergone any work up for her GERD's and does not take and prescribed antacids.     Chief Complaint   Patient presents with    Follow-up      Discuss Test Results         REVIEW OF SYSTEMS:  CONSTITUTIONAL:  negative  HEENT:  negative  RESPIRATORY:  negative  CARDIOVASCULAR:  negative  GASTROINTESTINAL:  negative except for abdominal pain and reflux  GENITOURINARY:  negative  HEMATOLOGIC/LYMPHATIC:  negative  MUSCULOSKELETAL: negative  NEUROLOGICAL:  negative    PMH  Past Medical History:   Diagnosis Date    Anxiety     Arthritis     Depression     Fibromyalgia        PSH  Past Surgical History:   Procedure Laterality Date    BACK SURGERY  2015    HYSTERECTOMY (CERVIX STATUS UNKNOWN)         Social History  Social History     Socioeconomic History    Marital status: Single     Spouse name: Not on file    Number of children: Not on file    Years of education: Not on file    Highest education level: Not on file   Occupational History    Not on file   Tobacco Use    Smoking status: Never    Smokeless tobacco: Never   Substance and Sexual Activity    Alcohol use: No    Drug use: No    Sexual activity: Not on file   Other Topics Concern    Not on file   Social History Narrative    Not on file     Social Determinants of Health     Financial Resource Strain: Not on file   Food Insecurity: Not on file   Transportation Needs: Not on file   Physical Activity: Not on file   Stress: Not on file   Social Connections: Not on file   Intimate Partner Violence: Not on file   Housing Stability: Not on file       Family History:   No family history on

## 2024-08-09 NOTE — TELEPHONE ENCOUNTER
Patient called requesting someone from the clinical team call her to discuss the results of the hida scan she had done this past Monday. Informed patient provider was in surgery today but is back in office tomorrow so if she doesn't hear anything this afternoon that they will follow up with her before the weekend    Please call patient [phone]   
Patient scheduled for 9/12/24  
Statement Selected
numerical 0-10

## 2024-08-14 ENCOUNTER — HOSPITAL ENCOUNTER (EMERGENCY)
Age: 74
Discharge: HOME | End: 2024-08-14
Payer: MEDICARE

## 2024-08-14 VITALS
SYSTOLIC BLOOD PRESSURE: 132 MMHG | WEIGHT: 179 LBS | RESPIRATION RATE: 16 BRPM | HEART RATE: 86 BPM | OXYGEN SATURATION: 97 % | TEMPERATURE: 97.1 F | DIASTOLIC BLOOD PRESSURE: 66 MMHG | BODY MASS INDEX: 28.09 KG/M2 | HEIGHT: 67 IN

## 2024-08-14 DIAGNOSIS — G47.00 INSOMNIA, UNSPECIFIED TYPE: ICD-10-CM

## 2024-08-14 DIAGNOSIS — Z76.0 ENCOUNTER FOR MEDICATION REFILL: Primary | ICD-10-CM

## 2024-08-14 PROCEDURE — 99283 EMERGENCY DEPT VISIT LOW MDM: CPT

## 2024-08-14 RX ORDER — LORAZEPAM 0.5 MG/1
0.5 TABLET ORAL DAILY
Qty: 12 TABLET | Refills: 0 | Status: SHIPPED | OUTPATIENT
Start: 2024-08-14 | End: 2024-09-13

## 2024-08-14 NOTE — ED PROVIDER NOTES
[facility] ED  EMERGENCY DEPARTMENT ENCOUNTER        Pt Name: Milli Moya  MRN: [id number]  Birthdate 1950  Date of evaluation: 8/14/2024  Provider: Funmi Bui PA-C  PCP: Fabio Carroll DO  Note Started: 4:43 PM EDT 8/14/24      ALEXIS. I have evaluated this patient.        CHIEF COMPLAINT       Chief Complaint   Patient presents with    Medication Refill     Pt needs a refill for lorazepam, pt unable to get into pcp due to them out of office       HISTORY OF PRESENT ILLNESS: 1 or more Elements     History from : Patient    Limitations to history : None    Milli Moya is a 74 y.o. female who presents patient states she needs her lorazepam refilled.  She has been taking it for several years to help her sleep.  She has 1 tablet left and her doctor is out of the office.  She went to urgent care but they will not prescribe a controlled substance for her so they advised her to come here.  Patient has not having any new complaints or symptoms she just does not want to run out of the medication because she cannot sleep without it.  She states her doctor referred her to another doctor however the he is not in network with her insurance so she said she was stuck and really did not know what to do.  Patient denies pain or suicidal ideation no homicidal ideations she just simply would like her medication refilled.  She has all of her other medications but the lorazepam.    Nursing Notes were all reviewed and agreed with or any disagreements were addressed in the HPI.    REVIEW OF SYSTEMS :      Review of Systems    Positives and Pertinent negatives as per HPI.     SURGICAL HISTORY     Past Surgical History:   Procedure Laterality Date    BACK SURGERY  2015    HYSTERECTOMY (CERVIX STATUS UNKNOWN)         CURRENTMEDICATIONS       Previous Medications    ALBUTEROL (PROVENTIL HFA;VENTOLIN HFA) 108 (90 BASE) MCG/ACT INHALER    Inhale 2 puffs into the lungs every 6 hours as needed for

## 2024-08-16 ENCOUNTER — OFFICE VISIT (OUTPATIENT)
Dept: SURGERY | Age: 74
End: 2024-08-16
Payer: MEDICARE

## 2024-08-16 DIAGNOSIS — K82.8 BILIARY DYSKINESIA: Primary | ICD-10-CM

## 2024-08-16 PROCEDURE — 99214 OFFICE O/P EST MOD 30 MIN: CPT | Performed by: SURGERY

## 2024-08-16 PROCEDURE — 1123F ACP DISCUSS/DSCN MKR DOCD: CPT | Performed by: SURGERY

## 2024-08-16 NOTE — H&P (VIEW-ONLY)
PATIENT NAME: Milli Moya     YOB: 1950     TODAY'S DATE: 8/16/2024    Reason for Visit:  Hiatal hernia    Requesting Physician:  Dr. Carroll    HISTORY OF PRESENT ILLNESS:              The patient is a 74 y.o. female with a PMHx as delineated below who presents with typical GERD like symptoms along with RUQ pain. The RUQ pain typically follows meals. She last saw her GI two years ago, and was scoped two years. Currently not on PPIs. She presents to follow up on her HIDA scan results.     Chief Complaint   Patient presents with    Follow-up      review HIDA scan results         REVIEW OF SYSTEMS:  CONSTITUTIONAL:  negative  HEENT:  negative  RESPIRATORY:  negative  CARDIOVASCULAR:  negative  GASTROINTESTINAL:  negative except for abdominal pain and reflux  GENITOURINARY:  negative  HEMATOLOGIC/LYMPHATIC:  negative  MUSCULOSKELETAL: negative  NEUROLOGICAL:  negative    PMH  Past Medical History:   Diagnosis Date    Anxiety     Arthritis     Depression     Fibromyalgia        PSH  Past Surgical History:   Procedure Laterality Date    BACK SURGERY  2015    HYSTERECTOMY (CERVIX STATUS UNKNOWN)         Social History  Social History     Socioeconomic History    Marital status: Single     Spouse name: Not on file    Number of children: Not on file    Years of education: Not on file    Highest education level: Not on file   Occupational History    Not on file   Tobacco Use    Smoking status: Never    Smokeless tobacco: Never   Vaping Use    Vaping status: Never Used   Substance and Sexual Activity    Alcohol use: No    Drug use: No    Sexual activity: Not on file   Other Topics Concern    Not on file   Social History Narrative    Not on file     Social Determinants of Health     Financial Resource Strain: Not on file   Food Insecurity: Unknown (1/20/2024)    Received from [facility] [facility]     Food Insecurities     Worried about running out of food: Not on file     Food Bought: Not on file  77%.    IMPRESSION/RECOMMENDATIONS:    The patient presents with RUQ pain and epigastric pain for quite some time now. HIDA scan reviewed with patient. The patient has findings consistent with symptomatic cholelithiasis. As a result, we will proceed with laparoscopic cholecystectomy. The risks, benefits, and expected recovery were discussed with the patient and he wishes to proceed.     Juanito Arias,   PGY2, General Surgery  08/16/24   12:03 PM   [employer]  [phone]    I have seen, examined, and reviewed the patients chart. I agree with the residents assessment and have made appropriate changes.    Michael Caal

## 2024-08-16 NOTE — PROGRESS NOTES
Comments)    Morphine     Nabumetone     Nefazodone Other (See Comments)     Curls stomach    Nsaids     Oxycontin [Oxycodone Hcl]     Papaya Enzymes     Penicillins     Percodan [Oxycodone-Aspirin]     Pregabalin Headaches    Prevacid [Lansoprazole]     Propulsid [Cisapride]     Pseudoephedrine     Robaxin [Methocarbamol]     Alejandra Oil     Soma [Carisoprodol]     Stadol [Butorphanol]     Sulfa Antibiotics     Synalgos-Dc [Aspirin-Caff-Dihydrocodeine]     Tetracyclines & Related     Trimox [Amoxicillin]     Tropicamide     Ultram [Tramadol]     Vicodin [Hydrocodone-Acetaminophen]     Voltaren [Diclofenac Sodium]     Zoloft [Sertraline Hcl]     Clindamycin Rash    Codeine Nausea And Vomiting    Naproxen Nausea And Vomiting     Questionable GI upset -- ok to take when given with Nexium    Orange Oil Palpitations    Tilactase Nausea And Vomiting     Sinus infections       PHYSICAL EXAM:  VITALS:  There were no vitals taken for this visit.    CONSTITUTIONAL:  alert, no apparent distress and normal weight  EYES:  sclera clear  ENT:  normocepalic, without obvious abnormality  NECK:  supple, symmetrical, trachea midline and no carotid bruits  LUNGS:  clear to auscultation  CARDIOVASCULAR:  regular rate and rhythm and no murmur noted  ABDOMEN:  no scars, normal bowel sounds, soft, non-distended, non-tender, voluntary guarding absent, no masses palpated and hernia absent. Minimal tenderness in RUQ.   MUSCULOSKELETAL:  0+ pitting edema lower extremities  NEUROLOGIC:  Mental Status Exam:  Level of Alertness:   awake  Orientation:   person, place, time  SKIN:  no bruising or bleeding and normal skin color, texture, turgor    Radiology Review:      No acute process is identified.     7/25/24 US: Echogenic nonshadowing focus in the gallbladder wall measures up to 5 mm and may represent a sludge ball versus small polyp.     8/5/24 HIDA:   No acute cholecystitis.     Gallbladder ejection fraction is

## 2024-08-19 ENCOUNTER — TELEPHONE (OUTPATIENT)
Dept: SURGERY | Age: 74
End: 2024-08-19

## 2024-08-20 ENCOUNTER — PREP FOR PROCEDURE (OUTPATIENT)
Dept: VASCULAR SURGERY | Age: 74
End: 2024-08-20

## 2024-08-20 DIAGNOSIS — K82.8 BILIARY DYSKINESIA: ICD-10-CM

## 2024-08-20 NOTE — TELEPHONE ENCOUNTER
Patient seen on 8/16/24 surgery letter received Laparoscopic Cholecystectomy with Cholangiogram  1 hr OR time needed under general     Pre op H&P instructions reviewed. MD to do H&P the DOS  Pre op packet mailed     Post op appt scheduled     Case request sent     Prep for proc completed     Placed on calendar and outlook

## 2024-08-21 RX ORDER — SODIUM CHLORIDE 9 MG/ML
INJECTION, SOLUTION INTRAVENOUS PRN
Status: CANCELLED | OUTPATIENT
Start: 2024-08-27

## 2024-08-21 RX ORDER — SODIUM CHLORIDE 0.9 % (FLUSH) 0.9 %
5-40 SYRINGE (ML) INJECTION EVERY 12 HOURS SCHEDULED
Status: CANCELLED | OUTPATIENT
Start: 2024-08-27

## 2024-08-21 RX ORDER — SODIUM CHLORIDE 0.9 % (FLUSH) 0.9 %
5-40 SYRINGE (ML) INJECTION PRN
Status: CANCELLED | OUTPATIENT
Start: 2024-08-27

## 2024-08-21 NOTE — PROGRESS NOTES
8/21/24 1549: Pt didn't want to continue with phone interview stating that she has a \"headache\" and \" hasn't eaten\".  This writer attempting to verify the more than 72 allergies listed for the pt. This writer confirmed that more convenient time to call pt would be tomorrow 8/22/24 after 1pm to finish phone interview.- BDS

## 2024-08-22 NOTE — PROGRESS NOTES
PRE-OP INSTRUCTIONS FOR SURGICAL PATIENTS          Our Pre-admission Testing Nurses tried and were unable to reach you today.  Please read the attached instructions if you did not listen to your voicemail.     Follow all instructions provided to you from your surgeon's office, including your ARRIVAL TIME.   Arrange for someone to drive you home and be with you for the first 24 hours after discharge.     NOTE: at this time ONLY 2 ADULTS may accompany you   One person encouraged to stay at hospital entire time if outpatient surgery    Enter the MAIN entrance located on [address] and report to the surgical desk on the LEFT side of the lobby. Please park in the parking garage or there is free  Parking available after 7am for your use.    Bring your insurance card & photo ID with you to register.  Bring your medication list with you with dose and frequency listed (including over the counter medications)  Contact your ordering physician/surgeon for medication instructions as soon as possible, especially if taking blood thinners, aspirin, heart, or diabetic medication.  Bariatric surgical patients need to call your surgeon if on diabetic medications (as some may need to be stopped 1-week preop)  A Pre-Surgical History and Physical MUST be completed WITHIN 30 DAYS OR LESS prior to your procedure by your Physician or an Urgent Care.  DO NOT EAT ANYTHING 8 hours prior to arrival for surgery.  You may have sips of WATER ONLY (up to 8 ounces) 4 hours prior to your arrival for surgery. Then nothing further 4 hours prior to arriving at hospital.   NOTE: ALL Gastric, Bariatric & Bowel surgery patients - you MUST follow your surgeon's instructions regarding eating/drinking as you will have very specific instructions to follow.  If you did not receive these, call your surgeon's office immediately.   No gum, candy, mints, or ice chips day of procedure.   Please refrain from drinking alcohol the day before or day of your

## 2024-08-26 ENCOUNTER — ANESTHESIA EVENT (OUTPATIENT)
Dept: OPERATING ROOM | Age: 74
End: 2024-08-26
Payer: MEDICARE

## 2024-08-26 RX ORDER — TRIAMCINOLONE ACETONIDE 55 UG/1
2 SPRAY, METERED NASAL DAILY
COMMUNITY

## 2024-08-26 RX ORDER — CLINDAMYCIN PHOSPHATE 900 MG/50ML
900 INJECTION, SOLUTION INTRAVENOUS
Status: CANCELLED | OUTPATIENT
Start: 2024-08-27 | End: 2024-08-27

## 2024-08-26 NOTE — ANESTHESIA PRE PROCEDURE
Department of Anesthesiology  Preprocedure Note       Name:  Milli Moya   Age:  74 y.o.  :  1950                                          MRN:  [id number]         Date:  2024      Surgeon: Surgeon(s):  Michael Caal MD    Procedure: Procedure(s):  LAPAROSCOPIC CHOLECYSTECTOMY WITH CHOLANGIOGRAM    Medications prior to admission:   Prior to Admission medications    Medication Sig Start Date End Date Taking? Authorizing Provider   triamcinolone (NASACORT ALLERGY 24HR) 55 MCG/ACT nasal inhaler 2 sprays by Each Nostril route daily    Dinesh Bonner MD   LORazepam (ATIVAN) 0.5 MG tablet Take 1 tablet by mouth Daily for 30 days. Max Daily Amount: 0.5 mg 24  Funmi Bui PA-C   thyroid (ARMOUR THYROID) 300 MG tablet Take 1 tablet by mouth daily    Dinesh Bonner MD   LORazepam (ATIVAN) 0.5 MG tablet Take 1 tablet by mouth daily.    Dinesh Bonner MD   DULoxetine (CYMBALTA) 60 MG capsule Take 1 capsule by mouth nightly    Dinesh Bonner MD   hydrOXYzine (ATARAX) 25 MG tablet Take 1 tablet by mouth 2 times daily    Dinesh Bonner MD   pregabalin (LYRICA) 100 MG capsule Take 75 mg by mouth nightly.    Dinesh Bonner MD   carBAMazepine (TEGRETOL) 200 MG tablet Take 1 tablet by mouth 2 times daily    Dinesh Bonner MD   PROGESTERONE, VAGINAL, 8 % GEL Place 2 g vaginally.    Dinesh Bonner MD   albuterol (PROVENTIL) (2.5 MG/3ML) 0.083% nebulizer solution Take 3 mLs by nebulization every 6 hours as needed for Wheezing    Dinesh Bonner MD   albuterol (PROVENTIL HFA;VENTOLIN HFA) 108 (90 BASE) MCG/ACT inhaler Inhale 2 puffs into the lungs every 6 hours as needed for Wheezing    Dinesh Bonner MD       Current medications:    No current facility-administered medications for this encounter.     Current Outpatient Medications   Medication Sig Dispense Refill   • triamcinolone (NASACORT ALLERGY 24HR) 55 MCG/ACT nasal inhaler  Spondylolisthesis of lumbar region M43.16   • Biliary dyskinesia K82.8       Past Medical History:        Diagnosis Date   • Anesthesia     \"HAVE TO BE CAREFUL WITH ANESTHESIA -DIFFICULTY WAKING UP\"   • Anxiety    • Arthritis    • Depression    • Drug intolerance     MULTIPLE DRUG INTOLERANCE   • Fall     8/29/2024   • Fibromyalgia    • History of blood transfusion    • Kidney stones    • Multiple allergies     MULTIPLE DRUG INTOLERANCES   • Prolonged emergence from general anesthesia    • Thyroid disease    • UTI (urinary tract infection)        Past Surgical History:        Procedure Laterality Date   • BACK SURGERY  2015   • HYSTERECTOMY (CERVIX STATUS UNKNOWN)         Social History:    Social History     Tobacco Use   • Smoking status: Never   • Smokeless tobacco: Never   Substance Use Topics   • Alcohol use: No                                Counseling given: Not Answered      Vital Signs (Current):   Vitals:    08/26/24 1325   Weight: 81.2 kg (179 lb)   Height: 1.702 m (5' 7\")                                              BP Readings from Last 3 Encounters:   08/14/24 132/66   06/17/24 (!) 149/72       NPO Status:                                                                                 BMI:   Wt Readings from Last 3 Encounters:   08/14/24 81.2 kg (179 lb)   08/05/24 79.4 kg (175 lb)   06/17/24 81.2 kg (179 lb)     Body mass index is 28.04 kg/m².    CBC:   Lab Results   Component Value Date/Time    WBC 6.1 06/22/2014 08:34 PM    RBC 4.46 06/22/2014 08:34 PM    HGB 13.1 06/22/2014 08:34 PM    HCT 38.2 06/22/2014 08:34 PM    MCV 85.8 06/22/2014 08:34 PM    RDW 12.2 06/22/2014 08:34 PM     06/22/2014 08:34 PM       CMP:   Lab Results   Component Value Date/Time     06/22/2014 08:34 PM    K 3.3 06/22/2014 08:34 PM    CL 93 06/22/2014 08:34 PM    CO2 22 06/22/2014 08:34 PM    BUN 13 06/22/2014 08:34 PM    CREATININE 0.6 06/22/2014 08:34 PM    GFRAA >60 06/22/2014 08:34 PM    AGRATIO 1.3

## 2024-08-26 NOTE — PROGRESS NOTES
8/26/2024 1407 PM:      [facility] PRE-SURGICAL TESTING INSTRUCTIONS                      PRIOR TO PROCEDURE DATE:    1. PLEASE FOLLOW ANY INSTRUCTIONS GIVEN TO YOU PER YOUR SURGEON.      2. Arrange for someone to drive you home and be with you for the first 24 hours after discharge for your safety after your procedure for which you received sedation. Ensure it is someone we can share information with regarding your discharge.     NOTE: At this time ONLY 2 ADULTS may accompany you. NO CHILDREN UNDER AGE OF 16.    One person ENCOURAGED to stay at hospital entire time if outpatient surgery      3. You must contact your surgeon for instructions IF:  You are taking any blood thinners, aspirin, anti-inflammatory or vitamins.   Contact your ordering physician/surgeon for prescription medication instructions as soon as possible, especially if taking blood thinners, aspirin, heart, or diabetic medication.   There is a change in your physical condition such as a cold, fever, rash, cuts, sores, or any other infection, especially near your surgical site.    4. Do not drink alcohol the day before or day of your procedure.  Do not use any recreational marijuana at least 24 hours or street drugs (heroin, cocaine) at minimum 5 days prior to your procedure.     5. A Pre-Surgical History and Physical MUST be completed WITHIN 30 DAYS OR LESS prior to your procedure.by your Physician or an Urgent Care        THE DAY OF YOUR PROCEDURE:  1.  Follow instructions for ARRIVAL TIME as DIRECTED BY YOUR SURGEON. [place] [phone]     2. Enter the MAIN entrance from [facility] and follow the signs to the free Parking Garage or  Parking (offered free of charge 7 am-5pm).      3. Enter the Main Entrance of the hospital (do not enter from the lower level of the parking garage). Upon entrance, check in with the  at the surgical information desk on your LEFT.   Bring your

## 2024-08-27 ENCOUNTER — HOSPITAL ENCOUNTER (OUTPATIENT)
Age: 74
Setting detail: OUTPATIENT SURGERY
Discharge: HOME | End: 2024-08-27
Attending: SURGERY | Admitting: SURGERY
Payer: MEDICARE

## 2024-08-27 ENCOUNTER — ANESTHESIA (OUTPATIENT)
Dept: OPERATING ROOM | Age: 74
End: 2024-08-27
Payer: MEDICARE

## 2024-08-27 ENCOUNTER — APPOINTMENT (OUTPATIENT)
Dept: GENERAL RADIOLOGY | Age: 74
End: 2024-08-27
Attending: SURGERY
Payer: MEDICARE

## 2024-08-27 VITALS
TEMPERATURE: 98.3 F | OXYGEN SATURATION: 94 % | WEIGHT: 180.8 LBS | HEIGHT: 67 IN | HEART RATE: 68 BPM | SYSTOLIC BLOOD PRESSURE: 146 MMHG | RESPIRATION RATE: 14 BRPM | DIASTOLIC BLOOD PRESSURE: 68 MMHG | BODY MASS INDEX: 28.38 KG/M2

## 2024-08-27 DIAGNOSIS — G89.18 ACUTE POST-OPERATIVE PAIN: Primary | ICD-10-CM

## 2024-08-27 DIAGNOSIS — K82.8 BILIARY DYSKINESIA: ICD-10-CM

## 2024-08-27 PROCEDURE — 3600000014 HC SURGERY LEVEL 4 ADDTL 15MIN: Performed by: SURGERY

## 2024-08-27 PROCEDURE — 2580000003 HC RX 258: Performed by: SURGERY

## 2024-08-27 PROCEDURE — 2580000003 HC RX 258: Performed by: ANESTHESIOLOGY

## 2024-08-27 PROCEDURE — 6360000002 HC RX W HCPCS: Performed by: SURGERY

## 2024-08-27 PROCEDURE — 3700000000 HC ANESTHESIA ATTENDED CARE: Performed by: SURGERY

## 2024-08-27 PROCEDURE — 74300 X-RAY BILE DUCTS/PANCREAS: CPT

## 2024-08-27 PROCEDURE — 3600000004 HC SURGERY LEVEL 4 BASE: Performed by: SURGERY

## 2024-08-27 PROCEDURE — 7100000010 HC PHASE II RECOVERY - FIRST 15 MIN: Performed by: SURGERY

## 2024-08-27 PROCEDURE — 2709999900 HC NON-CHARGEABLE SUPPLY: Performed by: SURGERY

## 2024-08-27 PROCEDURE — 6360000004 HC RX CONTRAST MEDICATION: Performed by: SURGERY

## 2024-08-27 PROCEDURE — 3700000001 HC ADD 15 MINUTES (ANESTHESIA): Performed by: SURGERY

## 2024-08-27 PROCEDURE — 6360000002 HC RX W HCPCS: Performed by: NURSE ANESTHETIST, CERTIFIED REGISTERED

## 2024-08-27 PROCEDURE — C1758 CATHETER, URETERAL: HCPCS | Performed by: SURGERY

## 2024-08-27 PROCEDURE — 7100000000 HC PACU RECOVERY - FIRST 15 MIN: Performed by: SURGERY

## 2024-08-27 PROCEDURE — 2500000003 HC RX 250 WO HCPCS: Performed by: NURSE ANESTHETIST, CERTIFIED REGISTERED

## 2024-08-27 PROCEDURE — 7100000001 HC PACU RECOVERY - ADDTL 15 MIN: Performed by: SURGERY

## 2024-08-27 PROCEDURE — 47563 LAPARO CHOLECYSTECTOMY/GRAPH: CPT

## 2024-08-27 PROCEDURE — A4217 STERILE WATER/SALINE, 500 ML: HCPCS | Performed by: SURGERY

## 2024-08-27 PROCEDURE — 6360000002 HC RX W HCPCS: Performed by: ANESTHESIOLOGY

## 2024-08-27 PROCEDURE — 88304 TISSUE EXAM BY PATHOLOGIST: CPT

## 2024-08-27 PROCEDURE — 7100000011 HC PHASE II RECOVERY - ADDTL 15 MIN: Performed by: SURGERY

## 2024-08-27 RX ORDER — OXYCODONE HYDROCHLORIDE 5 MG/1
5 TABLET ORAL EVERY 6 HOURS PRN
Qty: 12 TABLET | Refills: 0 | Status: SHIPPED | OUTPATIENT
Start: 2024-08-27 | End: 2024-08-30

## 2024-08-27 RX ORDER — PHENYLEPHRINE HYDROCHLORIDE 10 MG/ML
INJECTION INTRAVENOUS PRN
Status: DISCONTINUED | OUTPATIENT
Start: 2024-08-27 | End: 2024-08-27 | Stop reason: SDUPTHER

## 2024-08-27 RX ORDER — NALOXONE HYDROCHLORIDE 0.4 MG/ML
INJECTION, SOLUTION INTRAMUSCULAR; INTRAVENOUS; SUBCUTANEOUS PRN
Status: DISCONTINUED | OUTPATIENT
Start: 2024-08-27 | End: 2024-08-27 | Stop reason: HOSPADM

## 2024-08-27 RX ORDER — SODIUM CHLORIDE 9 MG/ML
INJECTION, SOLUTION INTRAVENOUS PRN
Status: DISCONTINUED | OUTPATIENT
Start: 2024-08-27 | End: 2024-08-27 | Stop reason: HOSPADM

## 2024-08-27 RX ORDER — LABETALOL HYDROCHLORIDE 5 MG/ML
INJECTION, SOLUTION INTRAVENOUS PRN
Status: DISCONTINUED | OUTPATIENT
Start: 2024-08-27 | End: 2024-08-27 | Stop reason: SDUPTHER

## 2024-08-27 RX ORDER — SODIUM CHLORIDE 0.9 % (FLUSH) 0.9 %
5-40 SYRINGE (ML) INJECTION EVERY 12 HOURS SCHEDULED
Status: DISCONTINUED | OUTPATIENT
Start: 2024-08-27 | End: 2024-08-27 | Stop reason: HOSPADM

## 2024-08-27 RX ORDER — METOCLOPRAMIDE HYDROCHLORIDE 5 MG/ML
10 INJECTION INTRAMUSCULAR; INTRAVENOUS
Status: DISCONTINUED | OUTPATIENT
Start: 2024-08-27 | End: 2024-08-27 | Stop reason: HOSPADM

## 2024-08-27 RX ORDER — CLINDAMYCIN PHOSPHATE 900 MG/50ML
900 INJECTION, SOLUTION INTRAVENOUS
Status: COMPLETED | OUTPATIENT
Start: 2024-08-27 | End: 2024-08-27

## 2024-08-27 RX ORDER — SODIUM CHLORIDE, SODIUM LACTATE, POTASSIUM CHLORIDE, CALCIUM CHLORIDE 600; 310; 30; 20 MG/100ML; MG/100ML; MG/100ML; MG/100ML
INJECTION, SOLUTION INTRAVENOUS CONTINUOUS
Status: DISCONTINUED | OUTPATIENT
Start: 2024-08-27 | End: 2024-08-27 | Stop reason: HOSPADM

## 2024-08-27 RX ORDER — SODIUM CHLORIDE 0.9 % (FLUSH) 0.9 %
5-40 SYRINGE (ML) INJECTION PRN
Status: DISCONTINUED | OUTPATIENT
Start: 2024-08-27 | End: 2024-08-27 | Stop reason: HOSPADM

## 2024-08-27 RX ORDER — PROPOFOL 10 MG/ML
INJECTION, EMULSION INTRAVENOUS PRN
Status: DISCONTINUED | OUTPATIENT
Start: 2024-08-27 | End: 2024-08-27 | Stop reason: SDUPTHER

## 2024-08-27 RX ORDER — MAGNESIUM HYDROXIDE 1200 MG/15ML
LIQUID ORAL CONTINUOUS PRN
Status: DISCONTINUED | OUTPATIENT
Start: 2024-08-27 | End: 2024-08-27 | Stop reason: HOSPADM

## 2024-08-27 RX ORDER — FAMOTIDINE 10 MG/ML
INJECTION, SOLUTION INTRAVENOUS PRN
Status: DISCONTINUED | OUTPATIENT
Start: 2024-08-27 | End: 2024-08-27 | Stop reason: SDUPTHER

## 2024-08-27 RX ORDER — DEXAMETHASONE SODIUM PHOSPHATE 4 MG/ML
INJECTION, SOLUTION INTRA-ARTICULAR; INTRALESIONAL; INTRAMUSCULAR; INTRAVENOUS; SOFT TISSUE PRN
Status: DISCONTINUED | OUTPATIENT
Start: 2024-08-27 | End: 2024-08-27 | Stop reason: SDUPTHER

## 2024-08-27 RX ORDER — FENTANYL CITRATE 50 UG/ML
25 INJECTION, SOLUTION INTRAMUSCULAR; INTRAVENOUS EVERY 5 MIN PRN
Status: DISCONTINUED | OUTPATIENT
Start: 2024-08-27 | End: 2024-08-27 | Stop reason: HOSPADM

## 2024-08-27 RX ORDER — HYDRALAZINE HYDROCHLORIDE 20 MG/ML
10 INJECTION INTRAMUSCULAR; INTRAVENOUS
Status: DISCONTINUED | OUTPATIENT
Start: 2024-08-27 | End: 2024-08-27 | Stop reason: HOSPADM

## 2024-08-27 RX ORDER — ONDANSETRON 2 MG/ML
4 INJECTION INTRAMUSCULAR; INTRAVENOUS
Status: DISCONTINUED | OUTPATIENT
Start: 2024-08-27 | End: 2024-08-27 | Stop reason: HOSPADM

## 2024-08-27 RX ORDER — LIDOCAINE HYDROCHLORIDE 20 MG/ML
INJECTION, SOLUTION INTRAVENOUS PRN
Status: DISCONTINUED | OUTPATIENT
Start: 2024-08-27 | End: 2024-08-27 | Stop reason: SDUPTHER

## 2024-08-27 RX ORDER — HYDROMORPHONE HYDROCHLORIDE 2 MG/ML
INJECTION, SOLUTION INTRAMUSCULAR; INTRAVENOUS; SUBCUTANEOUS PRN
Status: DISCONTINUED | OUTPATIENT
Start: 2024-08-27 | End: 2024-08-27 | Stop reason: SDUPTHER

## 2024-08-27 RX ORDER — MEPERIDINE HYDROCHLORIDE 25 MG/ML
12.5 INJECTION INTRAMUSCULAR; INTRAVENOUS; SUBCUTANEOUS EVERY 5 MIN PRN
Status: DISCONTINUED | OUTPATIENT
Start: 2024-08-27 | End: 2024-08-27 | Stop reason: HOSPADM

## 2024-08-27 RX ORDER — BUPIVACAINE HYDROCHLORIDE 5 MG/ML
INJECTION, SOLUTION EPIDURAL; INTRACAUDAL PRN
Status: DISCONTINUED | OUTPATIENT
Start: 2024-08-27 | End: 2024-08-27 | Stop reason: HOSPADM

## 2024-08-27 RX ORDER — ONDANSETRON 2 MG/ML
INJECTION INTRAMUSCULAR; INTRAVENOUS PRN
Status: DISCONTINUED | OUTPATIENT
Start: 2024-08-27 | End: 2024-08-27 | Stop reason: SDUPTHER

## 2024-08-27 RX ORDER — ROCURONIUM BROMIDE 10 MG/ML
INJECTION, SOLUTION INTRAVENOUS PRN
Status: DISCONTINUED | OUTPATIENT
Start: 2024-08-27 | End: 2024-08-27 | Stop reason: SDUPTHER

## 2024-08-27 RX ORDER — LABETALOL HYDROCHLORIDE 5 MG/ML
10 INJECTION, SOLUTION INTRAVENOUS
Status: DISCONTINUED | OUTPATIENT
Start: 2024-08-27 | End: 2024-08-27 | Stop reason: HOSPADM

## 2024-08-27 RX ORDER — FENTANYL CITRATE 50 UG/ML
50 INJECTION, SOLUTION INTRAMUSCULAR; INTRAVENOUS EVERY 5 MIN PRN
Status: COMPLETED | OUTPATIENT
Start: 2024-08-27 | End: 2024-08-27

## 2024-08-27 RX ADMIN — PROPOFOL 150 MG: 10 INJECTION, EMULSION INTRAVENOUS at 09:23

## 2024-08-27 RX ADMIN — SODIUM CHLORIDE, POTASSIUM CHLORIDE, SODIUM LACTATE AND CALCIUM CHLORIDE: 600; 310; 30; 20 INJECTION, SOLUTION INTRAVENOUS at 08:38

## 2024-08-27 RX ADMIN — SODIUM CHLORIDE, POTASSIUM CHLORIDE, SODIUM LACTATE AND CALCIUM CHLORIDE: 600; 310; 30; 20 INJECTION, SOLUTION INTRAVENOUS at 09:52

## 2024-08-27 RX ADMIN — ONDANSETRON 8 MG: 2 INJECTION INTRAMUSCULAR; INTRAVENOUS at 09:20

## 2024-08-27 RX ADMIN — SUGAMMADEX 200 MG: 100 INJECTION, SOLUTION INTRAVENOUS at 10:31

## 2024-08-27 RX ADMIN — CLINDAMYCIN PHOSPHATE 900 MG: 900 INJECTION, SOLUTION INTRAVENOUS at 09:31

## 2024-08-27 RX ADMIN — LIDOCAINE HYDROCHLORIDE 100 MG: 20 INJECTION, SOLUTION INTRAVENOUS at 09:23

## 2024-08-27 RX ADMIN — HYDROMORPHONE HYDROCHLORIDE 1 MG: 2 INJECTION, SOLUTION INTRAMUSCULAR; INTRAVENOUS; SUBCUTANEOUS at 09:23

## 2024-08-27 RX ADMIN — FAMOTIDINE 20 MG: 10 INJECTION, SOLUTION INTRAVENOUS at 09:20

## 2024-08-27 RX ADMIN — DEXAMETHASONE SODIUM PHOSPHATE 8 MG: 4 INJECTION INTRA-ARTICULAR; INTRALESIONAL; INTRAMUSCULAR; INTRAVENOUS; SOFT TISSUE at 09:35

## 2024-08-27 RX ADMIN — PHENYLEPHRINE HYDROCHLORIDE 100 MCG: 10 INJECTION, SOLUTION INTRAVENOUS at 09:35

## 2024-08-27 RX ADMIN — LABETALOL HYDROCHLORIDE 10 MG: 5 INJECTION, SOLUTION INTRAVENOUS at 09:52

## 2024-08-27 RX ADMIN — FENTANYL CITRATE 50 MCG: 50 INJECTION INTRAMUSCULAR; INTRAVENOUS at 11:21

## 2024-08-27 RX ADMIN — ROCURONIUM BROMIDE 80 MG: 10 INJECTION, SOLUTION INTRAVENOUS at 09:24

## 2024-08-27 RX ADMIN — FENTANYL CITRATE 50 MCG: 50 INJECTION INTRAMUSCULAR; INTRAVENOUS at 11:32

## 2024-08-27 ASSESSMENT — PAIN SCALES - GENERAL
PAINLEVEL_OUTOF10: 2
PAINLEVEL_OUTOF10: 4
PAINLEVEL_OUTOF10: 0
PAINLEVEL_OUTOF10: 7
PAINLEVEL_OUTOF10: 9

## 2024-08-27 ASSESSMENT — PAIN DESCRIPTION - PAIN TYPE: TYPE: SURGICAL PAIN

## 2024-08-27 ASSESSMENT — PAIN DESCRIPTION - DESCRIPTORS
DESCRIPTORS: DISCOMFORT
DESCRIPTORS: SORE

## 2024-08-27 ASSESSMENT — PAIN DESCRIPTION - FREQUENCY: FREQUENCY: CONTINUOUS

## 2024-08-27 ASSESSMENT — PAIN DESCRIPTION - LOCATION
LOCATION: ABDOMEN
LOCATION: ABDOMEN

## 2024-08-27 ASSESSMENT — PAIN - FUNCTIONAL ASSESSMENT
PAIN_FUNCTIONAL_ASSESSMENT: 0-10
PAIN_FUNCTIONAL_ASSESSMENT: PREVENTS OR INTERFERES SOME ACTIVE ACTIVITIES AND ADLS
PAIN_FUNCTIONAL_ASSESSMENT: 0-10
PAIN_FUNCTIONAL_ASSESSMENT: ACTIVITIES ARE NOT PREVENTED

## 2024-08-27 ASSESSMENT — PAIN DESCRIPTION - ONSET: ONSET: ON-GOING

## 2024-08-27 ASSESSMENT — PAIN DESCRIPTION - ORIENTATION: ORIENTATION: MID

## 2024-08-27 NOTE — PROGRESS NOTES
Vitals:    08/27/24 1300   BP: 132/67   Pulse: 71   Resp: 14   Temp: 97.9 °F (36.6 °C)   SpO2: 94%         Intake/Output Summary (Last 24 hours) at 8/27/2024 1305  Last data filed at 8/27/2024 1300  Gross per 24 hour   Intake 1890 ml   Output --   Net 1890 ml       Pain assessment:  present - adequately treated  Pain Level: 2    Patient transferred to care of SDS RN.    8/27/2024 1:05 PM

## 2024-08-27 NOTE — DISCHARGE INSTRUCTIONS
Discharge Instructions:    Diet:   You may resume a regular diet.    Wound Care:   Skin glue was used to cover your incision(s). It will fall off on its own in about 10 days. You may shower, but do not scrub the incision sites directly or soak (tub, pool, etc.).    Activity:   No heavy lifting greater than a milk jug until follow up.    Pain management:   Unless informed of any restrictions by your primary care physician, please use your preferred over-the-counter pain reliever as your primary pain medication. If you have pain that persists despite over-the-counter pain medications, you have been provided with a prescription for an opioid/narcotic pain reliever (Roxicodone). No driving or operating machinery while taking opioid/narcotic medications.     Bowel Regimen:   Opioid/Narcotic pain relievers have a common side effect of constipation; you may take over the counter stool softeners as needed. Please do not take stool softeners if you experience diarrhea.    Return Precautions:   Call/ Return to ED for increased redness, worsening pain, drainage from wound, fevers, or any other concerns about your incision or post op course.      Follow up with Dr. Caal in 1-2 weeks. Please call [phone] to schedule your appointment.    [facility] AMBULATORY PROCEDURE DISCHARGE INSTRUCTIONS    There are potential side effects of anesthesia or sedation you may experience for the first 24 hours.  These side effects include:    Confusion or Memory loss, Dizziness, or Delayed Reaction Times   [x]A responsible person should be with you for the next 24 hours.  Do not operate any vehicles (automobiles, bicycles, motorcycles) or power tools or machinery for 24 hours.  Do not sign any legal documents or make any legal decisions for 24 hours. Do not drink alcohol for 24 hours or while taking narcotic pain medication.      Nausea    [x]Start with light diet and progress to your normal diet as you feel like eating. However, if  you experience nausea or repeated episodes of vomiting which persist beyond 12-24 hours, notify your physician.  Once nausea has passed, remember to keep drinking fluids.    Difficulty Passing Urine  [x]Drink extra amounts of fluid today.  Notify your physician if you have not urinated within 8 hours after your procedure or you feel uncomfortable.      Irritated Throat from a Breathing Tube  [x]Drink extra amounts of fluid today.  Lozenges may help.    Muscle Aches  [x]You may experience some generalized body aches as your muscles recover from medications used to relax them during surgery.  These will gradually subside.    MEDICATION INSTRUCTIONS:    [x]Prescription(S) x  1   sent with you.  Use as directed.  When taking pain medications, you may experience the side effect of dizziness or drowsiness.  Do not drink alcohol or drive when taking these medications.    [x]Give the list of your medications to your primary care physician on your next visit. Keep your med list updated and carry it with in case of emergencies.    [x] Narcotic pain medications can cause the side effect of significant constipation.  You may want to add a stool softener to your postoperative medication schedule or speak to your surgeon on how best to manage this side effect.    NARCOTIC SAFETY:  Your pain medicine is only for you to take.  Safely store your medicines.  Store pills up high and out of reach of children and pets.  Ensure safety caps are snapped tightly  Keep track of how many pills you have left    Unused medication can be disposed of by taking them to a drop-off box or take-back program that is authorized by the [employer].  Access to a site near you can be found on the [employer]'s [employer] website (deadiversion.usdoj.gov).    If you have a CPAP machine, it is very important that you use it daily during all periods of sleep and daytime rest during your recovery at home.  Surgery and Anesthesia place a significant amount of

## 2024-08-27 NOTE — PROGRESS NOTES
Patient refused IV in hands. IV placed in left AC per patients request.  Electronically signed by Asya Broussard RN on 8/27/2024 at 8:38 AM

## 2024-08-27 NOTE — BRIEF OP NOTE
Brief Postoperative Note      Patient: Milli Moya  YOB: 1950  MRN: [id number]    Date of Procedure: 8/27/2024    Pre-Op Diagnosis Codes:      * Biliary dyskinesia [K82.8]    Post-Op Diagnosis: Same       Procedure(s):  LAPAROSCOPIC CHOLECYSTECTOMY WITH CHOLANGIOGRAM    Surgeon(s):  Michael Caal MD    Assistant:  Resident: Juanito Arias DO    Anesthesia: General    Estimated Blood Loss (mL): Minimal    Complications: None    Specimens:   ID Type Source Tests Collected by Time Destination   A : GALLBLADDER Tissue Tissue SURGICAL PATHOLOGY Michael Caal MD 8/27/2024 0953        Implants:  * No implants in log *      Drains: * No LDAs found *    Findings:  Infection Present At Time Of Surgery (PATOS) (choose all levels that have infection present):  No infection present  Other Findings: IOC without any filling defects.     Electronically signed by Juanito Arias DO on 8/27/2024 at 10:32 AM

## 2024-08-27 NOTE — OP NOTE
Operative Note      Patient: Milli Moya  YOB: 1950  MRN: [id number]    Date of Procedure: 8/27/2024    Pre-Op Diagnosis Codes:      * Biliary dyskinesia [K82.8]    Post-Op Diagnosis: Same       Procedure(s):  LAPAROSCOPIC CHOLECYSTECTOMY WITH CHOLANGIOGRAM    Surgeon(s):  Michael Caal MD    Assistant:   Resident: Juanito Arias DO    Anesthesia: General    Estimated Blood Loss (mL): Minimal    Complications: None    Specimens:   ID Type Source Tests Collected by Time Destination   A : GALLBLADDER Tissue Tissue SURGICAL PATHOLOGY Michael Caal MD 8/27/2024 0953        Implants:  * No implants in log *      Drains: * No LDAs found *    Findings:  Infection Present At Time Of Surgery (PATOS) (choose all levels that have infection present):  No infection present    FINDINGS: Normal intraoperative choleangiogram with filling of right and left hepatic ducts with normal emptying into duodenum.        DETAILS OF THE OPERATION:    The patient was brought to the operating room and placed in the supine position.  After adequate induction of general anesthetics, the patient was prepped and draped in a sterile fashion. A site was selected below the umbilicus for entrance into the abdominal cavity. Skin was anesthetized with 0.5% Marcaine. Incision was then made using a 11-blade scalpel, carried down through the subcutaneous tissue to expose the anterior fascia.  Following this, the fascia was grasped and raised into the wound using two towel clips. An incision in the fascia was made using an 11-blade scalpel. Blunt dissection was used to gain entrance into the abdominal cavity. Following entrance into the abdominal cavity, the 12-mm Veronica trocar was then introduced. The abdomen was insufflated. Following adequate insufflation, three additional 5-mm trocars were placed in the epigastrium, midclavicular line, and right anterior axillary line. Following placement of these trocars, the fundus of the  gallbladder was grasped, retracted cephalad.  The omental attachments to the gallbladder were taken down using electrocautery to allow for full exposure of the gallbladder. The cystic duct and artery were both identified, circumferentially dissected free and clearly showing to be entering the gallbladder. Bleeding was noted at the cystic artery by the lymph node and two clips were applied to the cystic artery. This was divided. Following this, a clip was then applied to the proximal cystic duct. The cystic duct was opened and the cholangiogram catheter was positioned in the cystic duct, and a cholangiogram was performed, which revealed no evidence of choledocholithiasis, prompt filling of the duodenum and appropriate filling of the intrahepatic biliary tree.  Following this, the catheter was removed. Two clips were applied to the distal cystic duct. The cystic duct was divided.  Following this, the gallbladder was then removed from the gallbladder bed using electrocautery and placed in a retrieval bag. At the fundus of the gallbladder, cystic artery branches were encountered and clipped. Electrocautery was used to obtain hemostasis. We copiously irrigated the right upper quadrant, aspirated back to clear fluid. Gallbladder bed was again inspected. There was good hemostasis noted throughout. Clips were all occlusive and intact. At this point, we de-insufflated the abdomen with clear visualization of the trocars being removed during this process.  We removed the gallbladder from the abdomen along with the 10-mm trocar. The 12-mm trocar site was closed using interrupted #0 Vicryl suture. The wounds were all irrigated, and the skin at the trocar sites were closed using 4-0 Monocryl. The wounds were cleaned, dried, and dressed with skin glue. The patient tolerated the procedure well, was woken up and brought to the PACU in stable condition. All counts were correct x2 at the end of the procedure. No complications.      2021

## 2024-08-27 NOTE — ANESTHESIA POSTPROCEDURE EVALUATION
Department of Anesthesiology  Postprocedure Note    Patient: Milli Moya  MRN: [id number]  YOB: 1950  Date of evaluation: 8/27/2024    Procedure Summary       Date: 08/27/24 Room / Location: [address]    Anesthesia Start: 0917 Anesthesia Stop:     Procedure: LAPAROSCOPIC CHOLECYSTECTOMY WITH CHOLANGIOGRAM Diagnosis:       Biliary dyskinesia      (Biliary dyskinesia [K82.8])    Surgeons: Michael Caal MD Responsible Provider: Jocelin Tidwell MD    Anesthesia Type: general ASA Status: 2            Anesthesia Type: No value filed.    Nica Phase I:      Nica Phase II:      Anesthesia Post Evaluation    Patient location during evaluation: PACU  Patient participation: complete - patient participated  Level of consciousness: awake  Pain score: 5  Airway patency: patent  Nausea & Vomiting: no nausea and no vomiting  Cardiovascular status: hemodynamically stable  Respiratory status: acceptable  Hydration status: euvolemic  Pain management: adequate    No notable events documented.

## 2024-08-27 NOTE — PROGRESS NOTES
Pt received from OR to PACU # 11 via stretcher.     Post: Procedure(s):  LAPAROSCOPIC CHOLECYSTECTOMY WITH CHOLANGIOGRAM     Report received from OR RN and TRENTON Sanchez CRNA, + Dr. CLARITA Arias.    Per report pt did well, has 87 listed allergies.    Respirations reg and easy.  Pt is drowsy.       Attached to PACU monitoring system. Alarms and parameters set    Pain none noted and no complaints of nausea.

## 2024-08-27 NOTE — INTERVAL H&P NOTE
Update History & Physical    The patient's History and Physical of August 16, 2024 was reviewed with the patient and I examined the patient. There was no change. The surgical site was confirmed by the patient and me.     Plan: The risks, benefits, expected outcome, and alternative to the recommended procedure have been discussed with the patient. Patient understands and wants to proceed with the procedure.     Electronically signed by Juanito Arias DO on 8/27/2024 at 8:58 AM

## 2024-08-27 NOTE — PROGRESS NOTES
Patients  does not receive text messages on his phone but can receive calls.  Electronically signed by Asya Broussard RN on 8/27/2024 at 8:55 AM

## 2024-08-27 NOTE — PROGRESS NOTES
Ambulatory Surgery/Procedure Discharge Note    Vitals:    08/27/24 1317   BP: (!) 146/68   Pulse: 68   Resp: 14   Temp: 98.3 °F (36.8 °C)   SpO2: 94%       In: 2130 [P.O.:480; I.V.:1650]  Out: -     Restroom use offered before discharge.  Yes    Pain assessment:  none  Pain Level: 0    BP within 20% of pre-op williams score.     Discharge order obtained, and discharge instructions reviewed. Patient educated, using the teach back method, about follow up instructions and discharge instructions. A completed copy of the AVS instructions given to patient and all questions answered. IV catheter removed without complaints, catheter intact, site WNL. ABD binder in place, education and demonstration for use of ABD binder to patient and family. Stated understanding. All valuables with patient and family at time of discharge.  Patient discharged to home/self care. Patient discharged via wheel chair by transporter to waiting family/S.O.       8/27/2024 1:50 PM

## 2024-09-09 ENCOUNTER — OFFICE VISIT (OUTPATIENT)
Dept: SURGERY | Age: 74
End: 2024-09-09

## 2024-09-09 VITALS
TEMPERATURE: 97.4 F | SYSTOLIC BLOOD PRESSURE: 126 MMHG | BODY MASS INDEX: 28.12 KG/M2 | OXYGEN SATURATION: 96 % | WEIGHT: 179.6 LBS | RESPIRATION RATE: 16 BRPM | HEART RATE: 80 BPM | DIASTOLIC BLOOD PRESSURE: 60 MMHG

## 2024-09-09 DIAGNOSIS — Z90.49 S/P LAPAROSCOPIC CHOLECYSTECTOMY: Primary | ICD-10-CM

## 2024-09-09 PROCEDURE — 99024 POSTOP FOLLOW-UP VISIT: CPT | Performed by: SURGERY

## 2025-04-16 ENCOUNTER — TRANSCRIBE ORDERS (OUTPATIENT)
Dept: ADMINISTRATIVE | Age: 75
End: 2025-04-16

## 2025-04-16 DIAGNOSIS — G89.29 CHRONIC NECK PAIN: Primary | ICD-10-CM

## 2025-04-16 DIAGNOSIS — M54.2 CHRONIC NECK PAIN: Primary | ICD-10-CM

## 2025-04-21 ENCOUNTER — TRANSCRIBE ORDERS (OUTPATIENT)
Dept: ADMINISTRATIVE | Age: 75
End: 2025-04-21

## 2025-04-21 DIAGNOSIS — G89.29 CHRONIC NECK PAIN: ICD-10-CM

## 2025-04-21 DIAGNOSIS — M54.2 CHRONIC NECK PAIN: ICD-10-CM

## 2025-04-21 DIAGNOSIS — M54.50 LOW BACK PAIN, UNSPECIFIED BACK PAIN LATERALITY, UNSPECIFIED CHRONICITY, UNSPECIFIED WHETHER SCIATICA PRESENT: Primary | ICD-10-CM

## 2025-04-23 ENCOUNTER — HOSPITAL ENCOUNTER (OUTPATIENT)
Dept: MRI IMAGING | Age: 75
Discharge: HOME | End: 2025-04-23
Payer: MEDICARE

## 2025-04-23 DIAGNOSIS — M54.2 CHRONIC NECK PAIN: ICD-10-CM

## 2025-04-23 DIAGNOSIS — G89.29 CHRONIC NECK PAIN: ICD-10-CM

## 2025-04-23 DIAGNOSIS — M54.50 LOW BACK PAIN, UNSPECIFIED BACK PAIN LATERALITY, UNSPECIFIED CHRONICITY, UNSPECIFIED WHETHER SCIATICA PRESENT: ICD-10-CM

## 2025-04-23 PROCEDURE — 72141 MRI NECK SPINE W/O DYE: CPT

## 2025-04-23 PROCEDURE — 72148 MRI LUMBAR SPINE W/O DYE: CPT

## 2025-09-05 ENCOUNTER — TRANSCRIBE ORDERS (OUTPATIENT)
Dept: ADMINISTRATIVE | Age: 75
End: 2025-09-05

## 2025-09-05 DIAGNOSIS — J45.909 ASTHMATIC BRONCHITIS WITHOUT COMPLICATION, UNSPECIFIED ASTHMA SEVERITY, UNSPECIFIED WHETHER PERSISTENT: Primary | ICD-10-CM

## (undated) DEVICE — LIQUIBAND RAPID ADHESIVE 36/CS 0.8ML: Brand: MEDLINE

## (undated) DEVICE — GLOVE SURG SZ 7 L12IN FNGR THK75MIL WHT LTX POLYMER BEAD

## (undated) DEVICE — MEDIA CONTRAST INJ OMNIPAQUE 300 50ML

## (undated) DEVICE — 40583 XL ADVANCED TRENDELENBURG POSITIONING KIT: Brand: 40583 XL ADVANCED TRENDELENBURG POSITIONING KIT

## (undated) DEVICE — SOLUTION SURG PREP 26 CC PURPREP

## (undated) DEVICE — SET EXTN PRIMING 4.9ML L30IN INCL SLDE CLMP SPIN M LUERLOCK

## (undated) DEVICE — GARMENT,MEDLINE,DVT,INT,CALF,MED, GEN2: Brand: MEDLINE

## (undated) DEVICE — CORD ES L10FT MPLR LAP

## (undated) DEVICE — BLADE ES ELASTOMERIC COAT INSUL DURABLE BEND UPTO 90DEG

## (undated) DEVICE — TROCARS: Brand: KII® BALLOON BLUNT TIP SYSTEM

## (undated) DEVICE — CATHETER URET 4FR L70CM POLYUR WHSTL FLX TIP KINK RESIST W/

## (undated) DEVICE — C-ARM: Brand: UNBRANDED

## (undated) DEVICE — NEPTUNE E-SEP SMOKE EVACUATION PENCIL, COATED, 70MM BLADE, PUSH BUTTON SWITCH: Brand: NEPTUNE E-SEP

## (undated) DEVICE — TROCAR: Brand: KII SHIELDED BLADED ACCESS SYSTEM

## (undated) DEVICE — SOLUTION INJ LR VISIV 1000ML BG

## (undated) DEVICE — SUTURE MONOCRYL + SZ 4-0 L18IN ABSRB UD L19MM PS-2 3/8 CIR MCP496G

## (undated) DEVICE — SYRINGE MED 10ML LUERLOCK TIP W/O SFTY DISP

## (undated) DEVICE — SUTURE VICRYL + SZ 0 L27IN ABSRB WHT CT-2 1/2 CIR TAPERPOINT VCP270H

## (undated) DEVICE — GENERAL LAPAROSCOPIC: Brand: MEDLINE INDUSTRIES, INC.

## (undated) DEVICE — LARGE BORE STOPCOCK WITH ROTATING MALE LUER LOCK

## (undated) DEVICE — NEEDLE,22GX1.5",REG,BEVEL: Brand: MEDLINE

## (undated) DEVICE — PUMP SUC IRR TBNG L10FT W/ HNDPC ASSEMB STRYKEFLOW 2

## (undated) DEVICE — COVER,LIGHT HANDLE,FLX,1/PK: Brand: MEDLINE INDUSTRIES, INC.

## (undated) DEVICE — KIT,ANTI FOG,W/SPONGE & FLUID,SOFT PACK: Brand: MEDLINE

## (undated) DEVICE — TISSUE RETRIEVAL SYSTEM: Brand: INZII RETRIEVAL SYSTEM

## (undated) DEVICE — TOWEL,STOP FLAG GOLD N-W: Brand: MEDLINE

## (undated) DEVICE — TRAP FLUID

## (undated) DEVICE — ELECTRODE LAP L36CM PTFE WIRE J HK CLEANCOAT

## (undated) DEVICE — SCISSORS ENDOSCP DIA5MM CRV MPLR CAUT W/ RATCH HNDL